# Patient Record
Sex: FEMALE | Race: WHITE
[De-identification: names, ages, dates, MRNs, and addresses within clinical notes are randomized per-mention and may not be internally consistent; named-entity substitution may affect disease eponyms.]

---

## 2019-02-28 ENCOUNTER — HOSPITAL ENCOUNTER (OUTPATIENT)
Dept: HOSPITAL 75 - RAD | Age: 29
End: 2019-02-28
Attending: OBSTETRICS & GYNECOLOGY
Payer: COMMERCIAL

## 2019-02-28 DIAGNOSIS — Z36.89: Primary | ICD-10-CM

## 2019-02-28 DIAGNOSIS — Z3A.22: ICD-10-CM

## 2019-02-28 PROCEDURE — 76805 OB US >/= 14 WKS SNGL FETUS: CPT

## 2019-02-28 NOTE — DIAGNOSTIC IMAGING REPORT
INDICATION: Fetal survey.



TECHNIQUE: Multiple real-time grayscale images were obtained over

the gravid uterus.



COMPARISON: There are no prior studies available for comparison.



FINDINGS: There is a single live fetus in variable presentation.

Fetal heart motion was noted and a rate of 126 BPM was recorded.

There were no fetal abnormalities identified. However, the fetal

spine and four-chamber heart view were not optimally visualized.

I would recommend that a short-term (4-6 week) followup exam be

performed for further study.



The placenta is anterior and there is no previa. The amniotic

fluid volume is within normal limits. The cervix was identified

and measures 6.1 cm in length.



The fetal growth parameters are fairly uniform.



IMPRESSION:

1. There is a single live fetus at approximately 22 weeks 2 days

gestation +/-2 weeks. The EDC is July 2, 2019.

2. There were no fetal abnormalities identified, although the

fetal spine and four-chamber heart view were not optimally

visualized. Recommendations, as above.

3. The fetal growth parameters are fairly uniform.



Biometrical measurements are as follows:

Biparietal 5.35 cm, age 22 weeks 2 days.

Head circumference 19.92 cm, age 22 weeks 1 days.

Abdominal circumference 17.03 cm, age 22 weeks 1 days.

Femur length 3.78 cm, age 22 weeks 1 days.



Sonographic estimate age: 22 weeks 2 days.

Sonographic estimated date of delivery: 07/02/2019.



Estimated Fetal Weight: 472 gm (+/- 69  gm).

LMP percentile: 70%.



Fetal heart rate: 126 beats per minute.



Fetal number: 1 of 1.



Dictated by: 



  Dictated on workstation # [id number]

## 2019-06-06 ENCOUNTER — HOSPITAL ENCOUNTER (OUTPATIENT)
Dept: HOSPITAL 75 - LABNPT | Age: 29
End: 2019-06-06
Attending: OBSTETRICS & GYNECOLOGY
Payer: COMMERCIAL

## 2019-06-06 DIAGNOSIS — O13.3: Primary | ICD-10-CM

## 2019-06-06 PROCEDURE — 82570 ASSAY OF URINE CREATININE: CPT

## 2019-06-06 PROCEDURE — 84156 ASSAY OF PROTEIN URINE: CPT

## 2019-06-07 ENCOUNTER — HOSPITAL ENCOUNTER (OUTPATIENT)
Dept: HOSPITAL 75 - WSO | Age: 29
Discharge: HOME | End: 2019-06-07
Attending: OBSTETRICS & GYNECOLOGY
Payer: COMMERCIAL

## 2019-06-07 VITALS — DIASTOLIC BLOOD PRESSURE: 85 MMHG | SYSTOLIC BLOOD PRESSURE: 139 MMHG

## 2019-06-07 VITALS — WEIGHT: 235 LBS | HEIGHT: 69 IN | BODY MASS INDEX: 34.8 KG/M2

## 2019-06-07 DIAGNOSIS — Z03.71: Primary | ICD-10-CM

## 2019-06-07 PROCEDURE — 99213 OFFICE O/P EST LOW 20 MIN: CPT

## 2019-06-07 NOTE — NUR
SHERI BASSETT presented to unit via  from ED, accompanied by S.O, with c/o LEAKING. 
SHERI BASSETT weighed, gowned, voided, and to bed.  EFHM and TOCO applied, VS taken.  
SHERI BASSETT oriented to bed controls, call light, TV, heat, and A/C controls. Assessments 
to follow per this rn.

## 2019-07-11 LAB
CREAT UR-MCNC: 30 MG/DL (ref 30–125)
PROT UR-MCNC: < 6 MG/DL (ref 6–12)

## 2020-11-18 ENCOUNTER — HOSPITAL ENCOUNTER (OUTPATIENT)
Dept: HOSPITAL 75 - RAD | Age: 30
End: 2020-11-18
Attending: FAMILY MEDICINE
Payer: COMMERCIAL

## 2020-11-18 DIAGNOSIS — N83.292: Primary | ICD-10-CM

## 2020-11-18 PROCEDURE — 84703 CHORIONIC GONADOTROPIN ASSAY: CPT

## 2020-11-18 PROCEDURE — 84702 CHORIONIC GONADOTROPIN TEST: CPT

## 2020-11-18 PROCEDURE — 76856 US EXAM PELVIC COMPLETE: CPT

## 2020-11-18 PROCEDURE — 36415 COLL VENOUS BLD VENIPUNCTURE: CPT

## 2020-11-18 PROCEDURE — 76830 TRANSVAGINAL US NON-OB: CPT

## 2020-11-18 NOTE — DIAGNOSTIC IMAGING REPORT
Examination: Ultrasound pelvis. 



Date: November 18, 2020.



Indication: 30-year-old female, right lower quadrant and

suprapubic pain.



Comparison: February 28, 2019. 



Technique: A sonogram of the pelvis was performed utilizing

transabdominal and endovaginal approaches assessing gray-scale

appearance and color Doppler flow. 



Findings: The uterus measures 7.8 x 3.8 x 4.9 cm. No focal

uterine masses are seen. The endometrium measures 0.9 cm in

diameter. 



The right ovary measures 3.6 cm x 2.2 cm x 2.5 cm. The left ovary

measures 3.1 cm x 2.5 cm x 3.6 cm. There is a cystic lesion in

the left adnexa measuring 2.2 x 2.6 x 1.9 cm in size with round

internal peripherally echogenic foci. This appears to be likely

arising from the left ovary. 



There is blood flow to both ovaries.



No free pelvic fluid is demonstrated. 



Impression: 

1. Complicated left ovarian cystic lesion which may relate to a

complicated cyst. Ectopic pregnancy is considered less likely as

this likely is arising from the left ovary which would be an

uncommon location for an ectopic pregnancy. Correlation with

laboratory values is recommended.

2. No free pelvic fluid.

3. Unremarkable appearance of the right ovary and uterus. 



Dictated by: 



  Dictated on workstation # [id number]

## 2021-10-15 ENCOUNTER — HOSPITAL ENCOUNTER (OUTPATIENT)
Dept: HOSPITAL 75 - RAD | Age: 31
End: 2021-10-15
Attending: OBSTETRICS & GYNECOLOGY
Payer: COMMERCIAL

## 2021-10-15 DIAGNOSIS — Z36.9: Primary | ICD-10-CM

## 2021-10-15 DIAGNOSIS — Z3A.20: ICD-10-CM

## 2021-10-15 PROCEDURE — 76805 OB US >/= 14 WKS SNGL FETUS: CPT

## 2021-10-15 NOTE — DIAGNOSTIC IMAGING REPORT
INDICATION: Fetal survey.



TECHNIQUE: Multiple real-time grayscale images were obtained over

the gravid uterus.



COMPARISON: None.



FINDINGS: There is a single live fetus in a cephalic

presentation. Fetal heart rate was recorded at 132 bpm. Placenta

is anterior. Amniotic fluid volume is normal. Fetal survey shows

fetal kidneys, bladder and stomach to be unremarkable. Fetal

brain is unremarkable. There is a four-chamber heart. There is a

three-vessel cord with normal insertion. Fetal spine is

unremarkable.



Biometrical measurements are as follows:

Biparietal 4.86 cm, age 20 weeks 5 days.

Head circumference 17.71 cm, age 20 weeks 2 days.

Abdominal circumference 15.30 cm, age 20 weeks 4 days.

Femur length 3.21 cm, age 20 weeks 0 days.



Sonographic estimate age: 20 weeks 3 days.

Sonographic estimated date of delivery: 03/01/22.



Estimated Fetal Weight: 344 gm (+/- 51  gm).

LMP percentile: 84%.



Fetal heart rate: 132 beats per minute.



Fetal number: 1 of 1.





IMPRESSION: Single live IUP 20 weeks 3 days gestational age.

Estimated date of confinement sonographically is 03/01/2022. 



Dictated by: 



  Dictated on workstation # [id number]

## 2022-02-24 ENCOUNTER — HOSPITAL ENCOUNTER (INPATIENT)
Dept: HOSPITAL 75 - LDRP | Age: 32
LOS: 3 days | Discharge: HOME | End: 2022-02-27
Attending: OBSTETRICS & GYNECOLOGY | Admitting: OBSTETRICS & GYNECOLOGY
Payer: COMMERCIAL

## 2022-02-24 VITALS — SYSTOLIC BLOOD PRESSURE: 132 MMHG | DIASTOLIC BLOOD PRESSURE: 94 MMHG

## 2022-02-24 VITALS — WEIGHT: 212.31 LBS | HEIGHT: 69.02 IN | BODY MASS INDEX: 31.44 KG/M2

## 2022-02-24 DIAGNOSIS — Z3A.38: ICD-10-CM

## 2022-02-24 DIAGNOSIS — D62: ICD-10-CM

## 2022-02-24 DIAGNOSIS — Z23: ICD-10-CM

## 2022-02-24 LAB
ALBUMIN SERPL-MCNC: 3.4 GM/DL (ref 3.2–4.5)
ALP SERPL-CCNC: 106 U/L (ref 40–136)
ALT SERPL-CCNC: 17 U/L (ref 0–55)
APTT PPP: YELLOW S
BACTERIA #/AREA URNS HPF: (no result) /HPF
BASOPHILS # BLD AUTO: 0.1 10^3/UL (ref 0–0.1)
BASOPHILS NFR BLD AUTO: 0 % (ref 0–10)
BASOPHILS NFR BLD MANUAL: 0 %
BILIRUB SERPL-MCNC: 0.2 MG/DL (ref 0.1–1)
BILIRUB UR QL STRIP: NEGATIVE
BUN/CREAT SERPL: 12
CALCIUM SERPL-MCNC: 8.8 MG/DL (ref 8.5–10.1)
CHLORIDE SERPL-SCNC: 108 MMOL/L (ref 98–107)
CO2 SERPL-SCNC: 17 MMOL/L (ref 21–32)
CREAT SERPL-MCNC: 0.82 MG/DL (ref 0.6–1.3)
CREAT UR-MCNC: 14 MG/DL (ref 30–125)
EOSINOPHIL # BLD AUTO: 0.1 10^3/UL (ref 0–0.3)
EOSINOPHIL NFR BLD AUTO: 0 % (ref 0–10)
EOSINOPHIL NFR BLD MANUAL: 1 %
FIBRINOGEN PPP-MCNC: CLEAR MG/DL
GFR SERPLBLD BASED ON 1.73 SQ M-ARVRAT: 97 ML/MIN
GLUCOSE SERPL-MCNC: 107 MG/DL (ref 70–105)
GLUCOSE UR STRIP-MCNC: NEGATIVE MG/DL
HCT VFR BLD CALC: 38 % (ref 35–52)
HGB BLD-MCNC: 12.6 G/DL (ref 11.5–16)
KETONES UR QL STRIP: NEGATIVE
LEUKOCYTE ESTERASE UR QL STRIP: NEGATIVE
LYMPHOCYTES # BLD AUTO: 3.8 10^3/UL (ref 1–4)
LYMPHOCYTES NFR BLD AUTO: 22 % (ref 12–44)
MANUAL DIFFERENTIAL PERFORMED BLD QL: YES
MCH RBC QN AUTO: 29 PG (ref 25–34)
MCHC RBC AUTO-ENTMCNC: 33 G/DL (ref 32–36)
MCV RBC AUTO: 87 FL (ref 80–99)
MONOCYTES # BLD AUTO: 1.1 10^3/UL (ref 0–1)
MONOCYTES NFR BLD AUTO: 7 % (ref 0–12)
MONOCYTES NFR BLD: 8 %
NEUTROPHILS # BLD AUTO: 12 10^3/UL (ref 1.8–7.8)
NEUTROPHILS NFR BLD AUTO: 70 % (ref 42–75)
NEUTS BAND NFR BLD MANUAL: 66 %
NEUTS BAND NFR BLD: 0 %
NITRITE UR QL STRIP: NEGATIVE
PH UR STRIP: 6 [PH] (ref 5–9)
PLATELET # BLD: 259 10^3/UL (ref 130–400)
PMV BLD AUTO: 10.3 FL (ref 9–12.2)
POTASSIUM SERPL-SCNC: 3.9 MMOL/L (ref 3.6–5)
PROT SERPL-MCNC: 6.7 GM/DL (ref 6.4–8.2)
PROT UR QL STRIP: NEGATIVE
PROT UR-MCNC: < 6 MG/DL (ref 6–12)
RBC #/AREA URNS HPF: (no result) /HPF
RBC MORPH BLD: NORMAL
SODIUM SERPL-SCNC: 138 MMOL/L (ref 135–145)
SP GR UR STRIP: <=1.005 (ref 1.02–1.02)
SQUAMOUS #/AREA URNS HPF: (no result) /HPF
URATE SERPL-MCNC: 5.1 MG/DL (ref 2.6–7.2)
VARIANT LYMPHS NFR BLD MANUAL: 25 %
WBC # BLD AUTO: 17.2 10^3/UL (ref 4.3–11)
WBC #/AREA URNS HPF: (no result) /HPF

## 2022-02-24 PROCEDURE — 83615 LACTATE (LD) (LDH) ENZYME: CPT

## 2022-02-24 PROCEDURE — 3E0DXGC INTRODUCTION OF OTHER THERAPEUTIC SUBSTANCE INTO MOUTH AND PHARYNX, EXTERNAL APPROACH: ICD-10-PCS | Performed by: OBSTETRICS & GYNECOLOGY

## 2022-02-24 PROCEDURE — 86850 RBC ANTIBODY SCREEN: CPT

## 2022-02-24 PROCEDURE — 90707 MMR VACCINE SC: CPT

## 2022-02-24 PROCEDURE — 80053 COMPREHEN METABOLIC PANEL: CPT

## 2022-02-24 PROCEDURE — 85027 COMPLETE CBC AUTOMATED: CPT

## 2022-02-24 PROCEDURE — 85025 COMPLETE CBC W/AUTO DIFF WBC: CPT

## 2022-02-24 PROCEDURE — 81000 URINALYSIS NONAUTO W/SCOPE: CPT

## 2022-02-24 PROCEDURE — 87088 URINE BACTERIA CULTURE: CPT

## 2022-02-24 PROCEDURE — 84156 ASSAY OF PROTEIN URINE: CPT

## 2022-02-24 PROCEDURE — 82570 ASSAY OF URINE CREATININE: CPT

## 2022-02-24 PROCEDURE — 36415 COLL VENOUS BLD VENIPUNCTURE: CPT

## 2022-02-24 PROCEDURE — 86900 BLOOD TYPING SEROLOGIC ABO: CPT

## 2022-02-24 PROCEDURE — 86901 BLOOD TYPING SEROLOGIC RH(D): CPT

## 2022-02-24 PROCEDURE — 85007 BL SMEAR W/DIFF WBC COUNT: CPT

## 2022-02-24 PROCEDURE — 84550 ASSAY OF BLOOD/URIC ACID: CPT

## 2022-02-24 RX ADMIN — SODIUM CHLORIDE, SODIUM LACTATE, POTASSIUM CHLORIDE, CALCIUM CHLORIDE, AND DEXTROSE MONOHYDRATE SCH MLS/HR: 600; 310; 30; 20; 5 INJECTION, SOLUTION INTRAVENOUS at 18:48

## 2022-02-24 NOTE — HISTORY & PHYSICAL-OB
OB - Chief Complaint & HPI


Date/Time


Date of Admission:


Date of Admission:  2022 at 16:29


Date seen by a Provider:  2022


Time Seen by a Provider:  15:30





Chief Complaint/History


OB-Reason for Admission/Chief:  Induction of Labor


Hx :  3


Hx Para:  1011


Expected Date of Delivery:  Mar 7, 2022


Gestational Age in Weeks:  38


Gestational Age in Days:  2


Indication for induction:  medical complication (gestational hypertension)


Other reason for admission:


Have been monitoring her blood pressures the last few weeks.  151/96 and 150/105

in clinic today.  More edema today.  no proteinuria.  No headache or blurred 

vision.  


Sent to labor and delivery for induction.


Admission Nurse Assessment Rev:  Yes


History of Labs


A+/-


HIV -\


HBsAg -


Hep C -


VDRL NR


Rub I


GBS -





Allergies and Home Medications


Allergies


Coded Allergies:  


     No Known Drug Allergies (Unverified , 19)





Patient Home Medication List


Home Medication List Reviewed:  Yes


Magnesium (Magnesium) 200 Mg Tablet, 800 MG PO DAILY, (Reported)


   Entered as Reported by: ALMA ROSA BEATTY on 22


   Last Action: New Order


Omega-3/Dha/Epa/Fish Oil (Fish Oil 1,000 mg Softgel) 1 Each Capsule, 1 EACH PO 

DAILY, (Reported)


   Entered as Reported by: LA MALLOY on 19


   Last Action: Reviewed


[id number]/Iron Fumarate/FA/Dss (Prenatal 19 Tablet) 1 Each Tablet, 1 EACH PO DAILY,

(Reported)


   Entered as Reported by: LA MALLOY on 19


   Last Action: Reviewed


Vitamin D (Vitamin D3) 10 Mcg Tablet, 400 MCG PO DAILY, (Reported)


   Entered as Reported by: ALMA ROSA BEATTY on 22


   Last Action: New Order


Discontinued Medications


Acetaminophen (Acetaminophen) 500 Mg Tablet, 1,000 MG PO Q8HR


   Discontinued Reason: No Longer Taking


   Prescribed by: CHATA SYED on 19 [id number]


   Last Action: Discontinued


Calcium Carbonate (Calcium) 500 Mg Tab.chew, 500 MG PO DAILY, (Reported)


   Discontinued Reason: No Longer Taking


   Entered as Reported by: LA MALLOY on 19


   Last Action: Discontinued


Dibucaine (Dibucaine) 30 Gm Oint, 0 GM TOP UD PRN for PAIN- SEE INSTRUCTIONS


   Discontinued Reason: No Longer Taking


   Prescribed by: CHATA SYED on 19


   Last Action: Discontinued


Ibuprofen (Ibu) 600 Mg Tablet, 600 MG PO Q6HR


   Discontinued Reason: No Longer Taking


   Prescribed by: CHATA SYED on 19


   Last Action: Discontinued





OB - History


Hx of Present Pregnancy


Ultrasounds:  Normal mid trimester US


Obstetrical Complications:  Gestational Hypertension


Medical Complications:  None





Prenatal Information


Pregnancy Induced Hypertension:  Yes


Maternal Gestational Diabetes:  No


Postpartum Hemorrhage:  No





Obstetrical History


Hx :  3


Hx Para:  1


Hx # Term Pregnancies:  1


Hx #  Pregnancies:  0


Number of Living Children:  1


Hx Pregnancy Termination:  No


Hx Total # of Abortions (Spona:  1


Hx Multiple Gestation:  No


Hx Ectopic Pregnancy:  No


Hx Stillbirth:  No


Hx Pregnancy Complication:  No


Hx Pregnancy Induced Hypertens:  Yes


Hx Maternal Gestational Diabet:  No


Hx Postpartum Hemorrhage:  No





Delivery History


Hx Dystocia:  No


Hx Forceps Assisted Delivery:  No


Hx Vacuum Extraction Assisted:  No


Hx Placenta Abnormality:  No


Hx Fetal Distress:  No


Hx Large For Gestational Age I:  No


Hx Small for Gestational Age I:  No


Hx  Section:  No


Hx Vaginal Delivery Post C-Sec:  No


Adverse Rxn to Tranfusion:  No





Patient Past Medical History





hypertension





Social History/Family History


Alcohol Use:  Denies Use


Recreational Drug Use:  No


Smoking Cessation:  Never smoker





Immunizations


Influenza Vaccine Up-to-Date:  Yes; Up-to-Date (10/21)


Hepatitis A:  Yes


Hepatitis B:  Yes


Tetanus Booster (TDap):  Less than 5yrs (2022)


Rubella:  immune


RPR/VDRL:  Negative


GBS Status:  Negative


HBsAG:  Negative





OB - Admission Exam


Physical Exam


HEENT:  NCAT


Heart:  Rhythm Normal


Lungs:  Clear


Abdomen:  Gravid


Extremities:  Edema


Reflexes:  Hyperreflexia Present


Cervical Dilatation:  4cm


Effacement:  50%


Station:  Ballot[place]


Membranes:  Intact


Fetal Heart Rate:  130's


Accelerations:  Accelerations Present


Decelerations:  No Decelerations


Short Term Variability:  Present


Long Term Variability:  Average (6-25)


Contractions on Admission:  >10 Minutes Apart





OB - Assessment/Plan/Diagnosis


Assessment


Assessment:  induction of labor


Admission Dx


38 week gestation


Gestational hypertension


Rh -


Admission Status:  Inpatient Order (span 2 midnights)


Reason for Inpatient Admission:  


labor





Plan


Plan:  Induction


Induction Method:  per Misoprostol Protocol (anticipate   Brynn Johns)











CHATA SYED DO               2022 17:12

## 2022-02-25 VITALS — DIASTOLIC BLOOD PRESSURE: 90 MMHG | SYSTOLIC BLOOD PRESSURE: 147 MMHG

## 2022-02-25 VITALS — DIASTOLIC BLOOD PRESSURE: 62 MMHG | SYSTOLIC BLOOD PRESSURE: 131 MMHG

## 2022-02-25 VITALS — DIASTOLIC BLOOD PRESSURE: 88 MMHG | SYSTOLIC BLOOD PRESSURE: 156 MMHG

## 2022-02-25 VITALS — DIASTOLIC BLOOD PRESSURE: 69 MMHG | SYSTOLIC BLOOD PRESSURE: 160 MMHG

## 2022-02-25 VITALS — SYSTOLIC BLOOD PRESSURE: 115 MMHG | DIASTOLIC BLOOD PRESSURE: 59 MMHG

## 2022-02-25 VITALS — SYSTOLIC BLOOD PRESSURE: 128 MMHG | DIASTOLIC BLOOD PRESSURE: 78 MMHG

## 2022-02-25 VITALS — SYSTOLIC BLOOD PRESSURE: 126 MMHG | DIASTOLIC BLOOD PRESSURE: 80 MMHG

## 2022-02-25 VITALS — SYSTOLIC BLOOD PRESSURE: 172 MMHG | DIASTOLIC BLOOD PRESSURE: 89 MMHG

## 2022-02-25 VITALS — DIASTOLIC BLOOD PRESSURE: 59 MMHG | SYSTOLIC BLOOD PRESSURE: 101 MMHG

## 2022-02-25 VITALS — DIASTOLIC BLOOD PRESSURE: 98 MMHG | SYSTOLIC BLOOD PRESSURE: 170 MMHG

## 2022-02-25 VITALS — DIASTOLIC BLOOD PRESSURE: 73 MMHG | SYSTOLIC BLOOD PRESSURE: 124 MMHG

## 2022-02-25 VITALS — SYSTOLIC BLOOD PRESSURE: 127 MMHG | DIASTOLIC BLOOD PRESSURE: 78 MMHG

## 2022-02-25 VITALS — SYSTOLIC BLOOD PRESSURE: 137 MMHG | DIASTOLIC BLOOD PRESSURE: 83 MMHG

## 2022-02-25 VITALS — SYSTOLIC BLOOD PRESSURE: 110 MMHG | DIASTOLIC BLOOD PRESSURE: 57 MMHG

## 2022-02-25 VITALS — SYSTOLIC BLOOD PRESSURE: 168 MMHG | DIASTOLIC BLOOD PRESSURE: 92 MMHG

## 2022-02-25 VITALS — DIASTOLIC BLOOD PRESSURE: 71 MMHG | SYSTOLIC BLOOD PRESSURE: 122 MMHG

## 2022-02-25 VITALS — DIASTOLIC BLOOD PRESSURE: 80 MMHG | SYSTOLIC BLOOD PRESSURE: 131 MMHG

## 2022-02-25 VITALS — DIASTOLIC BLOOD PRESSURE: 79 MMHG | SYSTOLIC BLOOD PRESSURE: 134 MMHG

## 2022-02-25 VITALS — SYSTOLIC BLOOD PRESSURE: 131 MMHG | DIASTOLIC BLOOD PRESSURE: 70 MMHG

## 2022-02-25 VITALS — SYSTOLIC BLOOD PRESSURE: 171 MMHG | DIASTOLIC BLOOD PRESSURE: 69 MMHG

## 2022-02-25 VITALS — DIASTOLIC BLOOD PRESSURE: 60 MMHG | SYSTOLIC BLOOD PRESSURE: 107 MMHG

## 2022-02-25 VITALS — SYSTOLIC BLOOD PRESSURE: 135 MMHG | DIASTOLIC BLOOD PRESSURE: 89 MMHG

## 2022-02-25 VITALS — SYSTOLIC BLOOD PRESSURE: 156 MMHG | DIASTOLIC BLOOD PRESSURE: 89 MMHG

## 2022-02-25 VITALS — SYSTOLIC BLOOD PRESSURE: 143 MMHG | DIASTOLIC BLOOD PRESSURE: 95 MMHG

## 2022-02-25 VITALS — DIASTOLIC BLOOD PRESSURE: 53 MMHG | SYSTOLIC BLOOD PRESSURE: 96 MMHG

## 2022-02-25 VITALS — SYSTOLIC BLOOD PRESSURE: 119 MMHG | DIASTOLIC BLOOD PRESSURE: 63 MMHG

## 2022-02-25 VITALS — SYSTOLIC BLOOD PRESSURE: 134 MMHG | DIASTOLIC BLOOD PRESSURE: 85 MMHG

## 2022-02-25 VITALS — DIASTOLIC BLOOD PRESSURE: 58 MMHG | SYSTOLIC BLOOD PRESSURE: 99 MMHG

## 2022-02-25 VITALS — SYSTOLIC BLOOD PRESSURE: 132 MMHG | DIASTOLIC BLOOD PRESSURE: 77 MMHG

## 2022-02-25 VITALS — DIASTOLIC BLOOD PRESSURE: 57 MMHG | SYSTOLIC BLOOD PRESSURE: 132 MMHG

## 2022-02-25 VITALS — DIASTOLIC BLOOD PRESSURE: 83 MMHG | SYSTOLIC BLOOD PRESSURE: 140 MMHG

## 2022-02-25 VITALS — SYSTOLIC BLOOD PRESSURE: 155 MMHG | DIASTOLIC BLOOD PRESSURE: 97 MMHG

## 2022-02-25 VITALS — SYSTOLIC BLOOD PRESSURE: 152 MMHG | DIASTOLIC BLOOD PRESSURE: 67 MMHG

## 2022-02-25 VITALS — SYSTOLIC BLOOD PRESSURE: 155 MMHG | DIASTOLIC BLOOD PRESSURE: 75 MMHG

## 2022-02-25 VITALS — DIASTOLIC BLOOD PRESSURE: 93 MMHG | SYSTOLIC BLOOD PRESSURE: 137 MMHG

## 2022-02-25 VITALS — DIASTOLIC BLOOD PRESSURE: 110 MMHG | SYSTOLIC BLOOD PRESSURE: 156 MMHG

## 2022-02-25 VITALS — SYSTOLIC BLOOD PRESSURE: 141 MMHG | DIASTOLIC BLOOD PRESSURE: 93 MMHG

## 2022-02-25 VITALS — SYSTOLIC BLOOD PRESSURE: 109 MMHG | DIASTOLIC BLOOD PRESSURE: 66 MMHG

## 2022-02-25 VITALS — DIASTOLIC BLOOD PRESSURE: 125 MMHG | SYSTOLIC BLOOD PRESSURE: 180 MMHG

## 2022-02-25 VITALS — SYSTOLIC BLOOD PRESSURE: 139 MMHG | DIASTOLIC BLOOD PRESSURE: 78 MMHG

## 2022-02-25 VITALS — SYSTOLIC BLOOD PRESSURE: 154 MMHG | DIASTOLIC BLOOD PRESSURE: 74 MMHG

## 2022-02-25 VITALS — DIASTOLIC BLOOD PRESSURE: 74 MMHG | SYSTOLIC BLOOD PRESSURE: 143 MMHG

## 2022-02-25 VITALS — SYSTOLIC BLOOD PRESSURE: 119 MMHG | DIASTOLIC BLOOD PRESSURE: 76 MMHG

## 2022-02-25 VITALS — SYSTOLIC BLOOD PRESSURE: 143 MMHG | DIASTOLIC BLOOD PRESSURE: 79 MMHG

## 2022-02-25 VITALS — DIASTOLIC BLOOD PRESSURE: 85 MMHG | SYSTOLIC BLOOD PRESSURE: 168 MMHG

## 2022-02-25 VITALS — SYSTOLIC BLOOD PRESSURE: 132 MMHG | DIASTOLIC BLOOD PRESSURE: 73 MMHG

## 2022-02-25 VITALS — SYSTOLIC BLOOD PRESSURE: 136 MMHG | DIASTOLIC BLOOD PRESSURE: 80 MMHG

## 2022-02-25 VITALS — DIASTOLIC BLOOD PRESSURE: 80 MMHG | SYSTOLIC BLOOD PRESSURE: 132 MMHG

## 2022-02-25 VITALS — SYSTOLIC BLOOD PRESSURE: 132 MMHG | DIASTOLIC BLOOD PRESSURE: 78 MMHG

## 2022-02-25 VITALS — SYSTOLIC BLOOD PRESSURE: 107 MMHG | DIASTOLIC BLOOD PRESSURE: 59 MMHG

## 2022-02-25 VITALS — DIASTOLIC BLOOD PRESSURE: 79 MMHG | SYSTOLIC BLOOD PRESSURE: 135 MMHG

## 2022-02-25 VITALS — SYSTOLIC BLOOD PRESSURE: 137 MMHG | DIASTOLIC BLOOD PRESSURE: 87 MMHG

## 2022-02-25 VITALS — DIASTOLIC BLOOD PRESSURE: 82 MMHG | SYSTOLIC BLOOD PRESSURE: 129 MMHG

## 2022-02-25 VITALS — DIASTOLIC BLOOD PRESSURE: 83 MMHG | SYSTOLIC BLOOD PRESSURE: 132 MMHG

## 2022-02-25 VITALS — SYSTOLIC BLOOD PRESSURE: 125 MMHG | DIASTOLIC BLOOD PRESSURE: 81 MMHG

## 2022-02-25 VITALS — DIASTOLIC BLOOD PRESSURE: 79 MMHG | SYSTOLIC BLOOD PRESSURE: 133 MMHG

## 2022-02-25 VITALS — DIASTOLIC BLOOD PRESSURE: 85 MMHG | SYSTOLIC BLOOD PRESSURE: 143 MMHG

## 2022-02-25 VITALS — DIASTOLIC BLOOD PRESSURE: 83 MMHG | SYSTOLIC BLOOD PRESSURE: 127 MMHG

## 2022-02-25 VITALS — DIASTOLIC BLOOD PRESSURE: 55 MMHG | SYSTOLIC BLOOD PRESSURE: 99 MMHG

## 2022-02-25 VITALS — DIASTOLIC BLOOD PRESSURE: 57 MMHG | SYSTOLIC BLOOD PRESSURE: 104 MMHG

## 2022-02-25 VITALS — DIASTOLIC BLOOD PRESSURE: 68 MMHG | SYSTOLIC BLOOD PRESSURE: 143 MMHG

## 2022-02-25 VITALS — SYSTOLIC BLOOD PRESSURE: 151 MMHG | DIASTOLIC BLOOD PRESSURE: 93 MMHG

## 2022-02-25 VITALS — SYSTOLIC BLOOD PRESSURE: 113 MMHG | DIASTOLIC BLOOD PRESSURE: 61 MMHG

## 2022-02-25 RX ADMIN — SODIUM CHLORIDE, SODIUM LACTATE, POTASSIUM CHLORIDE, CALCIUM CHLORIDE, AND DEXTROSE MONOHYDRATE SCH MLS/HR: 600; 310; 30; 20; 5 INJECTION, SOLUTION INTRAVENOUS at 10:39

## 2022-02-25 RX ADMIN — ACETAMINOPHEN SCH MG: 500 TABLET ORAL at 22:00

## 2022-02-25 RX ADMIN — ZOLPIDEM TARTRATE SCH MG: 5 TABLET ORAL at 21:00

## 2022-02-25 RX ADMIN — SODIUM CHLORIDE, SODIUM LACTATE, POTASSIUM CHLORIDE, CALCIUM CHLORIDE, AND DEXTROSE MONOHYDRATE SCH MLS/HR: 600; 310; 30; 20; 5 INJECTION, SOLUTION INTRAVENOUS at 02:59

## 2022-02-25 RX ADMIN — SODIUM CHLORIDE, SODIUM LACTATE, POTASSIUM CHLORIDE, CALCIUM CHLORIDE, AND DEXTROSE MONOHYDRATE SCH MLS/HR: 600; 310; 30; 20; 5 INJECTION, SOLUTION INTRAVENOUS at 19:00

## 2022-02-25 RX ADMIN — IBUPROFEN SCH MG: 600 TABLET ORAL at 21:01

## 2022-02-25 RX ADMIN — ZOLPIDEM TARTRATE SCH MG: 5 TABLET ORAL at 19:47

## 2022-02-25 RX ADMIN — DOCUSATE SODIUM SCH MG: 100 CAPSULE ORAL at 21:01

## 2022-02-25 NOTE — DISCHARGE INST-WOMEN'S SERVICE
Discharge Inst-Women's Serv


Depart Medication/Instructions


New, Converted or Re-Newed RX:  Transmitted to Pharmacy


Final Diagnosis


gestational hypertension


38 week gestation


Problems Reviewed?:  Yes





Consults/Follow Up


Additional Follow Up:  Yes (6 weeks )





Activity


Activity:  Activity as Tolerated


Driving Instructions:  You May Drive


NO SMOKING:  NO SMOKING


Nothing Inside Vagina:  No Douching, No [place], No Tampons





Diet


Discharge Diet:  No Restrictions


Symptoms to Report to :  Swelling Increased, Bleeding Excessive, Pain 

Increased, Fever Over 101 Degrees F, Vaginal Bleeding Increase, Cramps in Feet 

or Legs, Vaginal Discharge Foul


For Any Problems or Questions:  Contact Your Physician











CHATA SYED DO               Feb 25, 2022 15:07

## 2022-02-25 NOTE — OB TRIAGE REPORT
Standard Progress Note


Progress Notes/Assess & Plan


Date Seen by a Provider:  2022


Time Seen by a Provider:  08:30


Expected Date of Delivery:  Mar 7, 2022


Gestational Age in Weeks:  38


Gestational Age in Days:  3


LMP/ANÍBAL Comment:  


Induction due to hypertension at 38 + weeks


Progress/Assessment & Plan


admitted from clinic yesterday due to elevated blood pressures at term.  BP in 

the office 150/100 and 142/105





                          VS - Last 72 Hours, by Label








 22





 16:57 02:30 06:07


 


Temp 37.0 36.6 36.8


 


Pulse 100 81 78


 


Resp  16 16


 


B/P (MAP) 132/94 (107) 141/93 (109) 132/73 (92)


 


O2 Delivery Room Air  








Laboratory Tests








Test


 22


17:05 22


17:20 Range/Units


 


 


White Blood Count


 17.2 H


 


 4.3-11.0


10^3/uL


 


Red Blood Count


 4.36 


 


 3.80-5.11


10^6/uL


 


Hemoglobin 12.6   11.5-16.0  g/dL


 


Hematocrit 38   35-52  %


 


Mean Corpuscular Volume 87   80-99  fL


 


Mean Corpuscular Hemoglobin 29   25-34  pg


 


Mean Corpuscular Hemoglobin


Concent 33 


 


 32-36  g/dL





 


Red Cell Distribution Width 12.3   10.0-14.5  %


 


Platelet Count


 259 


 


 130-400


10^3/uL


 


Mean Platelet Volume 10.3   9.0-12.2  fL


 


Immature Granulocyte % (Auto) 1    %


 


Neutrophils (%) (Auto) 70   42-75  %


 


Lymphocytes (%) (Auto) 22   12-44  %


 


Monocytes (%) (Auto) 7   0-12  %


 


Eosinophils (%) (Auto) 0   0-10  %


 


Basophils (%) (Auto) 0   0-10  %


 


Neutrophils # (Auto)


 12.0 H


 


 1.8-7.8


10^3/uL


 


Lymphocytes # (Auto)


 3.8 


 


 1.0-4.0


10^3/uL


 


Monocytes # (Auto)


 1.1 H


 


 0.0-1.0


10^3/uL


 


Eosinophils # (Auto)


 0.1 


 


 0.0-0.3


10^3/uL


 


Basophils # (Auto)


 0.1 


 


 0.0-0.1


10^3/uL


 


Immature Granulocyte # (Auto)


 0.1 


 


 0.0-0.1


10^3/uL


 


Neutrophils % (Manual) 66    %


 


Lymphocytes % (Manual) 25    %


 


Monocytes % (Manual) 8    %


 


Eosinophils % (Manual) 1    %


 


Basophils % (Manual) 0    %


 


Band Neutrophils 0    %


 


Blood Morphology Comment NORMAL    


 


Sodium Level 138   135-145  MMOL/L


 


Potassium Level 3.9   3.6-5.0  MMOL/L


 


Chloride Level 108 H    MMOL/L


 


Carbon Dioxide Level 17 L  21-32  MMOL/L


 


Anion Gap 13   5-14  MMOL/L


 


Blood Urea Nitrogen 10   7-18  MG/DL


 


Creatinine


 0.82 


 


 0.60-1.30


MG/DL


 


Estimat Glomerular Filtration


Rate 97 


 


  





 


BUN/Creatinine Ratio 12    


 


Glucose Level 107 H    MG/DL


 


Uric Acid 5.1   2.6-7.2  MG/DL


 


Calcium Level 8.8   8.5-10.1  MG/DL


 


Corrected Calcium 9.3   8.5-10.1  MG/DL


 


Total Bilirubin 0.2   0.1-1.0  MG/DL


 


Aspartate Amino Transf


(AST/SGOT) 23 


 


 5-34  U/L





 


Alanine Aminotransferase


(ALT/SGPT) 17 


 


 0-55  U/L





 


Alkaline Phosphatase 106     U/L


 


Lactate Dehydrogenase 205   125-220  U/L


 


Total Protein 6.7   6.4-8.2  GM/DL


 


Albumin 3.4   3.2-4.5  GM/DL


 


Urine Color  YELLOW   


 


Urine Clarity  CLEAR   


 


Urine pH  6.0  5-9  


 


Urine Specific Gravity  <=1.005  1.016-1.022  


 


Urine Protein  NEGATIVE  NEGATIVE  


 


Urine Glucose (UA)  NEGATIVE  NEGATIVE  


 


Urine Ketones  NEGATIVE  NEGATIVE  


 


Urine Nitrite  NEGATIVE  NEGATIVE  


 


Urine Bilirubin  NEGATIVE  NEGATIVE  


 


Urine Urobilinogen  0.2  < = 1.0  MG/DL


 


Urine Leukocyte Esterase  NEGATIVE  NEGATIVE  


 


Urine RBC (Auto)  NEGATIVE  NEGATIVE  


 


Urine RBC  NONE   /HPF


 


Urine WBC  NONE   /HPF


 


Urine Squamous Epithelial


Cells 


 0-2 


  /HPF





 


Urine Crystals  NONE   /LPF


 


Urine Bacteria  TRACE   /HPF


 


Urine Casts  NONE   /LPF


 


Urine Mucus  NEGATIVE   /LPF


 


Urine Culture Indicated  NO   


 


Urine Creatinine  14 L   MG/DL


 


Urine Protein/Creatinine Ratio     





Received misoprostol x 1, (50 mcg) and then was jordan regularly so no 

additional dose was given.


She has walked and has done well.


Currently rare contractions


category 1 fetal strip


SVE 4-5/50/-2 but anterior.  and head is applied (was posterior and not applied 

yesterday)


AROM, clear





Plan augmentation as indicated.


Epidural when desired


Anticipate 


Final Diagnosis


38 week gestation


gestational hypertension











CHATA SYED DO               2022 08:44

## 2022-02-26 VITALS — DIASTOLIC BLOOD PRESSURE: 82 MMHG | SYSTOLIC BLOOD PRESSURE: 157 MMHG

## 2022-02-26 VITALS — SYSTOLIC BLOOD PRESSURE: 145 MMHG | DIASTOLIC BLOOD PRESSURE: 80 MMHG

## 2022-02-26 VITALS — DIASTOLIC BLOOD PRESSURE: 81 MMHG | SYSTOLIC BLOOD PRESSURE: 147 MMHG

## 2022-02-26 VITALS — SYSTOLIC BLOOD PRESSURE: 140 MMHG | DIASTOLIC BLOOD PRESSURE: 81 MMHG

## 2022-02-26 VITALS — DIASTOLIC BLOOD PRESSURE: 84 MMHG | SYSTOLIC BLOOD PRESSURE: 147 MMHG

## 2022-02-26 VITALS — SYSTOLIC BLOOD PRESSURE: 136 MMHG | DIASTOLIC BLOOD PRESSURE: 78 MMHG

## 2022-02-26 LAB
BASOPHILS # BLD AUTO: 0 10^3/UL (ref 0–0.1)
BASOPHILS NFR BLD AUTO: 0 % (ref 0–10)
EOSINOPHIL # BLD AUTO: 0.1 10^3/UL (ref 0–0.3)
EOSINOPHIL NFR BLD AUTO: 1 % (ref 0–10)
HCT VFR BLD CALC: 33 % (ref 35–52)
HGB BLD-MCNC: 10.8 G/DL (ref 11.5–16)
LYMPHOCYTES # BLD AUTO: 4.5 10^3/UL (ref 1–4)
LYMPHOCYTES NFR BLD AUTO: 29 % (ref 12–44)
MANUAL DIFFERENTIAL PERFORMED BLD QL: NO
MCH RBC QN AUTO: 30 PG (ref 25–34)
MCHC RBC AUTO-ENTMCNC: 33 G/DL (ref 32–36)
MCV RBC AUTO: 90 FL (ref 80–99)
MONOCYTES # BLD AUTO: 1 10^3/UL (ref 0–1)
MONOCYTES NFR BLD AUTO: 7 % (ref 0–12)
NEUTROPHILS # BLD AUTO: 9.7 10^3/UL (ref 1.8–7.8)
NEUTROPHILS NFR BLD AUTO: 63 % (ref 42–75)
PLATELET # BLD: 199 10^3/UL (ref 130–400)
PMV BLD AUTO: 10.3 FL (ref 9–12.2)
WBC # BLD AUTO: 15.5 10^3/UL (ref 4.3–11)

## 2022-02-26 RX ADMIN — IBUPROFEN SCH MG: 600 TABLET ORAL at 12:11

## 2022-02-26 RX ADMIN — ACETAMINOPHEN SCH MG: 500 TABLET ORAL at 17:31

## 2022-02-26 RX ADMIN — IBUPROFEN SCH MG: 600 TABLET ORAL at 05:03

## 2022-02-26 RX ADMIN — VITAMIN A ACETATE, .BETA.-CAROTENE, ASCORBIC ACID, CHOLECALCIFEROL, .ALPHA.-TOCOPHEROL ACETATE, DL-, THIAMINE MONONITRATE, RIBOFLAVIN, NIACINAMIDE, PYRIDOXINE HYDROCHLORIDE, FOLIC ACID, CYANOCOBALAMIN, CALCIUM CARBONATE, FERROUS FUMARATE, ZINC OXIDE, AND CUPRIC OXIDE SCH EA: 2000; 2000; 120; 400; 22; 1.84; 3; 20; 10; 1; 12; 200; 27; 25; 2 TABLET ORAL at 09:05

## 2022-02-26 RX ADMIN — FERROUS SULFATE TAB 325 MG (65 MG ELEMENTAL FE) SCH MG: 325 (65 FE) TAB at 09:04

## 2022-02-26 RX ADMIN — ACETAMINOPHEN SCH MG: 500 TABLET ORAL at 09:05

## 2022-02-26 RX ADMIN — DOCUSATE SODIUM SCH MG: 100 CAPSULE ORAL at 09:04

## 2022-02-26 RX ADMIN — DOCUSATE SODIUM SCH MG: 100 CAPSULE ORAL at 21:02

## 2022-02-26 RX ADMIN — IBUPROFEN SCH MG: 600 TABLET ORAL at 04:27

## 2022-02-26 RX ADMIN — IBUPROFEN SCH MG: 600 TABLET ORAL at 21:02

## 2022-02-26 RX ADMIN — ZOLPIDEM TARTRATE SCH MG: 5 TABLET ORAL at 21:58

## 2022-02-26 NOTE — POSTPARTUM PROGRESS NOTE
Postpartum Note


Postpartum Note


Postpartum Day # 1





Subjective:


Patient is without complaints. Ambulating, voiding. Tolerating a regular diet 

without nausea or vomiting. Normal lochia. Pain is well controlled with oral 

pain medications. She is breastfeeding, pumping and bottlfeeding. 





Objective:





Vital Signs








 22





 14:36 05:00


 


Temp  36.8


 


Pulse  71


 


Resp  18


 


B/P (MAP)  147/84 (105)


 


Pulse Ox  98


 


O2 Delivery  Room Air


 


O2 Flow Rate 15.00 











Physical Exam:


General - Alert and oriented, no apparent distress


Abdomen - Soft, appropriately tender to palpation, non-distended, fundus firm at

umbilicus


Extremities - No edema, negative Sandee's bilaterally 





Assessment:


Post-partum day # 1, status post vaginal delivery due to gestational 

hypertension.


Recovering well, hemodynamically stable





Plan:


Routine postpartum care.


GHTN: BP range is the followin-147/66-83. Will continue to monitor and 

consider starting labetalol 100mg BID to control blood pressures in postpartum 

period. 


VMI, Circ desired. Encourage breast feeding.


Heme: Preop hgb 12.6 --> postop hgb 10.8. Vitals stable. No s/s of anemia. 

Ferrous sulfate supplementation.


VTE ppx: Encourage ambulation.


Plan for discharge tomorrow.





Vitals - Labs


Vital Signs - I&O





Vital Signs








  Date Time  Temp Pulse Resp B/P (MAP) Pulse Ox O2 Delivery O2 Flow Rate FiO2


 


22 05:00 36.8 71 18 147/84 (105) 98 Room Air  


 


22 02:00 36.5 72 18 140/81 (100) 97 Room Air  


 


22 21:00 36.8 99 18 156/88 (110) 97 Room Air  


 


22 17:03 36.7 76 18 137/83 (101)  Room Air  


 


22 16:37  96 18 104/57 (73)  Room Air  


 


22 16:06  76 18 143/74 (97)  Room Air  


 


22 15:30 36.6 88 18 171/69 (103)  Room Air  


 


22 15:27  86 18 143/79 (100)  Room Air  


 


22 15:23 36.9 87 18 131/62 (85)  Room Air  


 


22 15:20  89 18 132/57 (82)  Room Air  


 


22 15:16  96 18 168/92 (117)  Room Air  


 


22 15:10  98 18 180/125 (143)  Room Air  


 


22 15:04  117 18 122/71 (88)  Room Air  


 


22 14:58  98 18 143/68 (93)  Room Air  


 


22 14:55  96 18 154/74 (100)  Room Air  


 


22 14:52 36.5 103 18 155/75 (101)  Room Air  


 


22 14:49  110 18 152/67 (95)  Room Air  


 


22 14:36  114 18 155/97 (116)  Non Rebreather 15.00 


 


22 14:33  100 18 147/90 (109)  Non Rebreather 15.00 


 


22 14:30  102 18 135/89 (104)  Non Rebreather 15.00 


 


22 14:28  96 18 143/85 (104) 91 Non Rebreather 15.00 


 


22 14:24  99 18 170/98 (122) 100 Non Rebreather 15.00 


 


22 14:22  91 18 139/78 (98) 100 Non Rebreather 15.00 


 


22 14:19  87 18 156/89 (111) 100 Non Rebreather 15.00 


 


22 14:16  65 18 132/80 (97)  Non Rebreather 15.00 


 


22 14:13  66 18 128/78 (95) 100 Non Rebreather 15.00 


 


22 14:10  69 18 135/79 (97)  Non Rebreather 15.00 


 


22 14:07  70 18 132/77 (95) 100 Non Rebreather 15.00 


 


22 14:04  71 18 134/85 (101) 100 Non Rebreather 15.00 


 


22 14:01  69 18 132/83 (99)  Non Rebreather 15.00 


 


22 13:58 35.5 78 18 137/87 (104) 100 Non Rebreather 15.00 


 


22 13:54  75 18 136/80 (98) 100 Non Rebreather 15.00 


 


22 13:51  91 18 132/78 (96) 100 Non Rebreather 15.00 


 


22 13:40  71 18 119/63 (81) 100 Non Rebreather 15.00 


 


22 13:36  82 18 107/59 (75) 100 Non Rebreather 15.00 


 


22 13:33  59 18 107/60 (76) 100 Non Rebreather 15.00 


 


22 13:30  60 18 113/61 (78) 100 Non Rebreather 15.00 


 


22 13:27  82 18 131/70 (90) 100 Non Rebreather 15.00 


 


22 13:25  81 18 131/70 (90)  Non Rebreather 15.00 


 


22 13:22  87 18 115/59 (77) 100 Non Rebreather 15.00 


 


22 13:17  56 18 99/58 (72)  Room Air  


 


22 13:15  59 18 99/55 (70) 100 Room Air  


 


22 13:12  60 18 96/53 (67) 100 Room Air  


 


22 13:10  69 18 110/57 (74)  Room Air  


 


22 13:07  69 18 119/76 (90) 100 Room Air  


 


22 13:02  89 18 126/80 (95) 100 Room Air  


 


22 12:57  86 18 168/85 (112) 100 Room Air  


 


22 12:54  77 18 160/69 (99) 100 Room Air  


 


22 12:40 36.5 89 18 172/89 (116) 100 Room Air  


 


22 12:24  76 18 156/110 (125)  Room Air  


 


22 12:07  70 18 143/95 (111)  Room Air  


 


22 11:54  73 18 151/93 (112)  Room Air  


 


22 11:39  72 18 137/93 (108)  Room Air  


 


22 11:24  73 18 129/82 (98)  Room Air  


 


22 11:08  70 18 125/81 (96)  Room Air  


 


22 10:53  65 18 127/83 (98)  Room Air  














I & O 


 


 22





 07:00


 


Intake Total 3750 ml


 


Balance 3750 ml











Labs


Laboratory Tests


22 05:13: 


White Blood Count 15.5H, Red Blood Count 3.64L, Hemoglobin 10.8L, Hematocrit 33L

, Mean Corpuscular Volume 90, Mean Corpuscular Hemoglobin 30, Mean Corpuscular 

Hemoglobin Concent 33, Red Cell Distribution Width 12.3, Platelet Count 199, 

Mean Platelet Volume 10.3, Immature Granulocyte % (Auto) 1, Neutrophils (%) 

(Auto) 63, Lymphocytes (%) (Auto) 29, Monocytes (%) (Auto) 7, Eosinophils (%) 

(Auto) 1, Basophils (%) (Auto) 0, Neutrophils # (Auto) 9.7H, Lymphocytes # 

(Auto) 4.5H, Monocytes # (Auto) 1.0, Eosinophils # (Auto) 0.1, Basophils # 

(Auto) 0.0, Immature Granulocyte # (Auto) 0.1





Microbiology


22 Urine Culture - Preliminary, Resulted


          Slight Growth Present











DANY GARNETT MD              2022 10:50

## 2022-02-26 NOTE — ANESTHESIA-REGIONAL POST-OP
Regional


Patient Condition


Mental Status:  Alert, Oriented x3


Circulation:  Same as Pre-Op


Headache:  Absent


Sensation:  Full Recovery


Motor Block:  Absent





Post Op Complications


Complications


None





Follow Up Care/Instructions


Patient Instructions


None needed.





Anesthesia/Patient Condition


Patient is doing well, no complaints, stable vital signs, no apparent adverse 

anesthesia problems.   


No complications reported per nursing.


D/C home per [facility] Criteria:  Yes











RODY VENCES CRNA           Feb 26, 2022 11:46

## 2022-02-27 VITALS — DIASTOLIC BLOOD PRESSURE: 88 MMHG | SYSTOLIC BLOOD PRESSURE: 152 MMHG

## 2022-02-27 VITALS — SYSTOLIC BLOOD PRESSURE: 158 MMHG | DIASTOLIC BLOOD PRESSURE: 104 MMHG

## 2022-02-27 VITALS — DIASTOLIC BLOOD PRESSURE: 81 MMHG | SYSTOLIC BLOOD PRESSURE: 141 MMHG

## 2022-02-27 VITALS — SYSTOLIC BLOOD PRESSURE: 154 MMHG | DIASTOLIC BLOOD PRESSURE: 94 MMHG

## 2022-02-27 RX ADMIN — ACETAMINOPHEN SCH MG: 500 TABLET ORAL at 06:35

## 2022-02-27 RX ADMIN — FERROUS SULFATE TAB 325 MG (65 MG ELEMENTAL FE) SCH MG: 325 (65 FE) TAB at 09:09

## 2022-02-27 RX ADMIN — DOCUSATE SODIUM SCH MG: 100 CAPSULE ORAL at 09:10

## 2022-02-27 RX ADMIN — IBUPROFEN SCH MG: 600 TABLET ORAL at 13:02

## 2022-02-27 RX ADMIN — VITAMIN A ACETATE, .BETA.-CAROTENE, ASCORBIC ACID, CHOLECALCIFEROL, .ALPHA.-TOCOPHEROL ACETATE, DL-, THIAMINE MONONITRATE, RIBOFLAVIN, NIACINAMIDE, PYRIDOXINE HYDROCHLORIDE, FOLIC ACID, CYANOCOBALAMIN, CALCIUM CARBONATE, FERROUS FUMARATE, ZINC OXIDE, AND CUPRIC OXIDE SCH EA: 2000; 2000; 120; 400; 22; 1.84; 3; 20; 10; 1; 12; 200; 27; 25; 2 TABLET ORAL at 06:35

## 2022-02-27 RX ADMIN — IBUPROFEN SCH MG: 600 TABLET ORAL at 06:35

## 2022-02-27 NOTE — POSTPARTUM PROGRESS NOTE
Postpartum Note


Postpartum Note


Postpartum Day # 2





Subjective:


Patient is without complaints. Ambulating, voiding. Tolerating a regular diet 

without nausea or vomiting. Normal lochia. Pain is well controlled with oral 

pain medications. Breastfeeding, bottlefeeding and pumping. She denies HA, 

blurry vision, SOB, chest pain, and RUQ abdominal pain. She anticipates 

discharge home today. 





Objective:





                          VS - Last 72 Hours, by Label








 22





 16:57 02:30 06:07 08:36


 


Temp 37.0 36.6 36.8 37.0


 


Pulse 100 81 78 83


 


Resp  16 16 18


 


B/P (MAP) 132/94 (107) 141/93 (109) 132/73 (92) 140/83 (102)


 


O2 Delivery Room Air   Room Air


 


    





 22





 08:53 09:08 09:23 09:38


 


Pulse 84 96 69 66


 


Resp 18 18 18 18


 


B/P (MAP) 134/79 (97) 124/73 (90) 132/83 (99) 133/79 (97)


 


O2 Delivery Room Air Room Air Room Air Room Air





 22





 09:54 10:08 10:23 10:38


 


Pulse 68 67 75 


 


Resp 18 18 18 18


 


B/P (MAP) 109/66 (80) 101/59 (73) 127/78 (94) 131/80 (97)


 


O2 Delivery Room Air Room Air Room Air Room Air





 22





 10:53 11:08 11:24 11:39


 


Pulse 65 70 73 72


 


Resp 18 18 18 18


 


B/P (MAP) 127/83 (98) 125/81 (96) 129/82 (98) 137/93 (108)


 


O2 Delivery Room Air Room Air Room Air Room Air





 22





 11:54 12:07 12:24 12:40


 


Temp    36.5


 


Pulse 73 70 76 89


 


Resp 18 18 18 18


 


B/P (MAP) 151/93 (112) 143/95 (111) 156/110 (125) 172/89 (116)


 


Pulse Ox    100


 


O2 Delivery Room Air Room Air Room Air Room Air


 


    





 22





 12:54 12:57 13:02 13:07


 


Pulse 77 86 89 69


 


Resp 18 18 18 18


 


B/P (MAP) 160/69 (99) 168/85 (112) 126/80 (95) 119/76 (90)


 


Pulse Ox 100 100 100 100


 


O2 Delivery Room Air Room Air Room Air Room Air





 22





 13:10 13:12 13:15 13:17


 


Pulse 69 60 59 56


 


Resp 18 18 18 18


 


B/P (MAP) 110/57 (74) 96/53 (67) 99/55 (70) 99/58 (72)


 


Pulse Ox  100 100 


 


O2 Delivery Room Air Room Air Room Air Room Air





 22





 13:22 13:25 13:27 13:30


 


Pulse 87 81 82 60


 


Resp 18 18 18 18


 


B/P (MAP) 115/59 (77) 131/70 (90) 131/70 (90) 113/61 (78)


 


Pulse Ox 100  100 100


 


O2 Delivery Non Rebreather Non Rebreather Non Rebreather Non Rebreather


 


O2 Flow Rate 15.00 15.00 15.00 15.00





 22





 13:33 13:36 13:40 13:51


 


Pulse 59 82 71 91


 


Resp 18 18 18 18


 


B/P (MAP) 107/60 (76) 107/59 (75) 119/63 (81) 132/78 (96)


 


Pulse Ox 100 100 100 100


 


O2 Delivery Non Rebreather Non Rebreather Non Rebreather Non Rebreather


 


O2 Flow Rate 15.00 15.00 15.00 15.00





 22





 13:54 13:58 14:01 14:04


 


Temp  35.5  


 


Pulse 75 78 69 71


 


Resp 18 18 18 18


 


B/P (MAP) 136/80 (98) 137/87 (104) 132/83 (99) 134/85 (101)


 


Pulse Ox 100 100  100


 


O2 Delivery Non Rebreather Non Rebreather Non Rebreather Non Rebreather


 


O2 Flow Rate 15.00 15.00 15.00 15.00


 


    





 22





 14:07 14:10 14:13 14:16


 


Pulse 70 69 66 65


 


Resp 18 18 18 18


 


B/P (MAP) 132/77 (95) 135/79 (97) 128/78 (95) 132/80 (97)


 


Pulse Ox 100  100 


 


O2 Delivery Non Rebreather Non Rebreather Non Rebreather Non Rebreather


 


O2 Flow Rate 15.00 15.00 15.00 15.00





 22





 14:19 14:22 14:24 14:28


 


Pulse 87 91 99 96


 


Resp 18 18 18 18


 


B/P (MAP) 156/89 (111) 139/78 (98) 170/98 (122) 143/85 (104)


 


Pulse Ox 100 100 100 91


 


O2 Delivery Non Rebreather Non Rebreather Non Rebreather Non Rebreather


 


O2 Flow Rate 15.00 15.00 15.00 15.00





 22





 14:30 14:33 14:36 14:49


 


Pulse 102 100 114 110


 


Resp 18 18 18 18


 


B/P (MAP) 135/89 (104) 147/90 (109) 155/97 (116) 152/67 (95)


 


O2 Delivery Non Rebreather Non Rebreather Non Rebreather Room Air


 


O2 Flow Rate 15.00 15.00 15.00 





 22





 14:52 14:55 14:58 15:04


 


Temp 36.5   


 


Pulse 103 96 98 117


 


Resp 18 18 18 18


 


B/P (MAP) 155/75 (101) 154/74 (100) 143/68 (93) 122/71 (88)


 


O2 Delivery Room Air Room Air Room Air Room Air


 


    





 22





 15:10 15:16 15:20 15:23


 


Temp    36.9


 


Pulse 98 96 89 87


 


Resp 18 18 18 18


 


B/P (MAP) 180/125 (143) 168/92 (117) 132/57 (82) 131/62 (85)


 


O2 Delivery Room Air Room Air Room Air Room Air


 


    





 22





 15:27 15:30 16:06 16:37


 


Temp  36.6  


 


Pulse 86 88 76 96


 


Resp 18 18 18 18


 


B/P (MAP) 143/79 (100) 171/69 (103) 143/74 (97) 104/57 (73)


 


O2 Delivery Room Air Room Air Room Air Room Air


 


    





 22





 17:03 21:00 02:00 05:00


 


Temp 36.7 36.8 36.5 36.8


 


Pulse 76 99 72 71


 


Resp 18 18 18 18


 


B/P (MAP) 137/83 (101) 156/88 (110) 140/81 (100) 147/84 (105)


 


Pulse Ox  97 97 98


 


O2 Delivery Room Air Room Air Room Air Room Air


 


    





 22





 09:00 12:10 17:29 21:05


 


Temp 36.4 36.7 36.5 36.9


 


Pulse 75 74 79 68


 


Resp 18 18 18 18


 


B/P (MAP) 136/78 (97) 145/80 (101) 157/82 (107) 147/81 (103)


 


Pulse Ox 98 98  97


 


O2 Delivery Room Air Room Air Room Air Room Air


 


    





 22  





 02:30 09:07  


 


Temp 36.9 36.4  


 


Pulse 66 75  


 


Resp 18 18  


 


B/P (MAP) 154/94 (114) 158/104 (122)  


 


Pulse Ox 98 98  


 


O2 Delivery Room Air Room Air  





BP range: 136-158/, mostly 150s/90s





Physical Exam:


General - Alert and oriented, no apparent distress


Abdomen - Soft, appropriately tender to palpation, non-distended, fundus firm at

umbilicus


Extremities - no edema, negative Sandee's bilaterally 





Assessment:


[] post-partum day # [], status post [] vaginal delivery.


Recovering well, hemodynamically stable





Plan:


Routine postpartum care.


GHTN: BP range is the followin-158/, mostly 150s/90s. Will start 

labetalol 100mg now with plans for BID dosing.  Will increase to 200mg BID if 

patient's BP do not improve prior to discharge. PreE precautions reviewed. 


VMI, Circ completed. Encourage breast feeding.


Heme: Preop hgb 12.6 --> postop hgb 10.8. Vitals stable. No s/s of anemia. F

errous sulfate supplementation.


VTE ppx: Encourage ambulation.


Plan for discharge today with plans for BP check in 1 week.  Appropriate 

precautions reviewed.





Vitals - Labs


Vital Signs - I&O





Vital Signs








  Date Time  Temp Pulse Resp B/P (MAP) Pulse Ox O2 Delivery O2 Flow Rate FiO2


 


22 09:07 36.4 75 18 158/104 (122) 98 Room Air  


 


22 02:30 36.9 66 18 154/94 (114) 98 Room Air  


 


22 21:05 36.9 68 18 147/81 (103) 97 Room Air  


 


22 17:29 36.5 79 18 157/82 (107)  Room Air  


 


22 12:10 36.7 74 18 145/80 (101) 98 Room Air  











Labs





Microbiology


22 Urine Culture - Final, Complete


          Lactobacillus species











DANY GARNETT MD              2022 10:11

## 2022-02-27 NOTE — SHORT STAY SUMMARY
Discharge Summary


Hospital Course


Was the Problem List Reviewed?:  Yes


Final Diagnosis:  Postpartum care following vaginal delivery


Hospital Course


Date of Admission: 2022 at 16:29 


Admission Diagnosis :  





Family Physician/Provider: Leti Downs DO  





Date of Discharge: 22 


Discharge Diagnosis: Postpartum care following vagina delivery, Gestational 

Hypertension, 38 weeks gestation








Hospital Course:


Tita Freire is a 32 yo  s/p  who presented at 38w2d for IOL due to 

gestational hypertension.  She delivered a viable male infant and tolerated the 

procedure well.  Her postpartum course has been complicated by mildly elevated 

blood pressures requiring anti-hypertensive medication (labetalol 200mg BID) 

with adequate control of her blood pressures.  She has had no symptoms of 

preeclampsia in the postpartum period.  Her postpartum course was also 

complicated by acute blood loss anemia following her delivery that required 

FeSO4 supplementation. She was discharged home on PPD#2 in stable condition, 

meeting all milestones.  She was breast and bottlefeeding prior to discharge and

opted to start POPs at discharge. She is to follow-up in 1 week for a BP check 

in the office. 














Labs and Pending Lab Test:





Microbiology


22 Urine Culture - Final, Complete


          Lactobacillus species





Home Meds


Active


Ortho Micronor (Norethindrone) 0.35 Mg Tablet 0.35 Mg PO DAILY 30 Days


Acetaminophen 500 Mg Tablet 1,000 Mg PO Q8HR


Ibu (Ibuprofen) 600 Mg Tablet 600 Mg PO Q6HR


Reported


Vitamin D3 (Vitamin D) 10 Mcg Tablet 400 Mcg PO DAILY


Magnesium 200 Mg Tablet 800 Mg PO DAILY


Fish Oil 1,000 mg Softgel (Omega-3/Dha/Epa/Fish Oil) 1 Each Capsule 1 Each PO 

DAILY


Prenatal 19 Tablet (Lhi887/Iron Fumarate/FA/Dss) 1 Each Tablet 1 Each PO DAILY


Assessment/Pt Instructions


Patient is to follow-up in office in 1 week for a blood pressure check. Please 

see hospital course.





Discharge Instructions


Discharge Diet:  No Restrictions





Discharge Physical Examination


Allergies:  


Coded Allergies:  


     No Known Drug Allergies (Unverified , 19)





Discharge Summary


Date of Admission


2022 at 16:29


Date of Discharge














DANY GARNETT MD              2022 10:18

## 2022-10-28 ENCOUNTER — HOSPITAL ENCOUNTER (OUTPATIENT)
Dept: HOSPITAL 75 - RAD | Age: 32
End: 2022-10-28
Attending: NURSE PRACTITIONER
Payer: COMMERCIAL

## 2022-10-28 DIAGNOSIS — R22.41: Primary | ICD-10-CM

## 2022-10-28 PROCEDURE — 76881 US COMPL JOINT R-T W/IMG: CPT

## 2022-10-28 NOTE — DIAGNOSTIC IMAGING REPORT
INDICATION: Right thigh mass. Symptoms x4 months. Feels like it

is increasing in size.



EXAMINATION: Right lower extremity sonogram from 10/28/2022.



FINDINGS:



Grayscale and color Doppler ultrasound imaging of the right thigh

at the site of palpable concern is performed. In the region,

there is a large solid appearing mass which measures 12.3 x 4.9 x

5.9 cm. Some associated vascularity is noted.



IMPRESSION:

1. Large slightly vascular solid lesion at the site of palpable

concern. This is nonspecific sonographically. MRI of the region

with and without contrast recommended to exclude a sarcoma or

other soft tissue masses.



Dictated by: 



  Dictated on workstation # [id number]

## 2022-11-16 ENCOUNTER — HOSPITAL ENCOUNTER (OUTPATIENT)
Dept: HOSPITAL 75 - RAD | Age: 32
End: 2022-11-16
Attending: NURSE PRACTITIONER
Payer: COMMERCIAL

## 2022-11-16 DIAGNOSIS — R22.41: Primary | ICD-10-CM

## 2022-11-16 PROCEDURE — 73720 MRI LWR EXTREMITY W/O&W/DYE: CPT

## 2022-11-17 NOTE — DIAGNOSTIC IMAGING REPORT
PROCEDURE: MRI left lower extremity with and without contrast.



TECHNIQUE:  Multiplanar, multisequence pre and post

contrast-enhanced MRI of the left lower extremity was

accomplished.



INDICATION: Localized swelling, mass or lump in the right lower

limb, increasing in size.



COMPARISON: Ultrasound from 10/28/2022.



FINDINGS:



There is a large mass in the region of the biceps femoris

musculature, measuring up to 7.6 x 4.5 cm in greatest axial

dimensions, and 18.5 cm craniocaudal. It is unclear if this is

intramuscular or intermuscular. This could potentially originate

from within the lateral distal aspect of the adductor julian

muscle or from the short head of the biceps femoris muscle. There

is mass effect on the surrounding musculature and mild medial

displacement of the neurovascular bundle. The mass does abut the

posterior cortex of the femur but there is no periosteal reaction

or cortical erosion appreciated. This mass is T1 isointense to

muscle and demonstrates enhancement throughout. There are some

peripheral and septal hypointensities. The mass is

heterogeneously hyperintense on T2-weighted imaging.



No acute osseous abnormality is seen in the femur. No other

enhancing masses or lesions are seen. There is no

lymphadenopathy. There is no focal muscular atrophy. No soft

tissue fluid collections are seen.



IMPRESSION:

1. Large enhancing mass in the deep soft tissues of the

posterolateral right thigh. 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

neoplasm, potentially malignancy. Recommend tissue sampling and

consider surgical oncology consult.



Dictated by: 



  Dictated on workstation # [id number]